# Patient Record
Sex: FEMALE | Race: WHITE | NOT HISPANIC OR LATINO
[De-identification: names, ages, dates, MRNs, and addresses within clinical notes are randomized per-mention and may not be internally consistent; named-entity substitution may affect disease eponyms.]

---

## 2017-01-03 ENCOUNTER — APPOINTMENT (OUTPATIENT)
Dept: OBGYN | Facility: CLINIC | Age: 47
End: 2017-01-03

## 2017-01-03 VITALS
WEIGHT: 145 LBS | HEART RATE: 99 BPM | BODY MASS INDEX: 24.75 KG/M2 | DIASTOLIC BLOOD PRESSURE: 75 MMHG | HEIGHT: 64 IN | SYSTOLIC BLOOD PRESSURE: 114 MMHG

## 2017-01-05 LAB — HPV HIGH+LOW RISK DNA PNL CVX: NEGATIVE

## 2017-01-07 LAB — CYTOLOGY CVX/VAG DOC THIN PREP: NORMAL

## 2019-05-22 ENCOUNTER — APPOINTMENT (OUTPATIENT)
Dept: OBGYN | Facility: CLINIC | Age: 49
End: 2019-05-22
Payer: COMMERCIAL

## 2019-05-22 VITALS
DIASTOLIC BLOOD PRESSURE: 70 MMHG | BODY MASS INDEX: 24.75 KG/M2 | HEIGHT: 64 IN | WEIGHT: 145 LBS | SYSTOLIC BLOOD PRESSURE: 120 MMHG

## 2019-05-22 PROCEDURE — 99396 PREV VISIT EST AGE 40-64: CPT

## 2019-05-31 LAB
CYTOLOGY CVX/VAG DOC THIN PREP: NORMAL
HPV HIGH+LOW RISK DNA PNL CVX: NOT DETECTED

## 2020-01-28 ENCOUNTER — EMERGENCY (EMERGENCY)
Facility: HOSPITAL | Age: 50
LOS: 1 days | Discharge: HOME | End: 2020-01-28
Attending: EMERGENCY MEDICINE
Payer: COMMERCIAL

## 2020-01-28 VITALS
HEIGHT: 66 IN | HEART RATE: 118 BPM | SYSTOLIC BLOOD PRESSURE: 148 MMHG | OXYGEN SATURATION: 100 % | WEIGHT: 145.06 LBS | TEMPERATURE: 98 F | RESPIRATION RATE: 20 BRPM | DIASTOLIC BLOOD PRESSURE: 86 MMHG

## 2020-01-28 LAB
ALBUMIN SERPL ELPH-MCNC: 4.6 G/DL — SIGNIFICANT CHANGE UP (ref 3.3–5.2)
ALP SERPL-CCNC: 58 U/L — SIGNIFICANT CHANGE UP (ref 40–120)
ALT FLD-CCNC: 14 U/L — SIGNIFICANT CHANGE UP
ANION GAP SERPL CALC-SCNC: 15 MMOL/L — SIGNIFICANT CHANGE UP (ref 5–17)
APTT BLD: 30.7 SEC — SIGNIFICANT CHANGE UP (ref 27.5–36.3)
AST SERPL-CCNC: 21 U/L — SIGNIFICANT CHANGE UP
BASOPHILS # BLD AUTO: 0.03 K/UL — SIGNIFICANT CHANGE UP (ref 0–0.2)
BASOPHILS NFR BLD AUTO: 0.2 % — SIGNIFICANT CHANGE UP (ref 0–2)
BILIRUB SERPL-MCNC: 0.6 MG/DL — SIGNIFICANT CHANGE UP (ref 0.4–2)
BUN SERPL-MCNC: 15 MG/DL — SIGNIFICANT CHANGE UP (ref 8–20)
CALCIUM SERPL-MCNC: 9.4 MG/DL — SIGNIFICANT CHANGE UP (ref 8.6–10.2)
CHLORIDE SERPL-SCNC: 104 MMOL/L — SIGNIFICANT CHANGE UP (ref 98–107)
CO2 SERPL-SCNC: 21 MMOL/L — LOW (ref 22–29)
CREAT SERPL-MCNC: 0.56 MG/DL — SIGNIFICANT CHANGE UP (ref 0.5–1.3)
EOSINOPHIL # BLD AUTO: 0.05 K/UL — SIGNIFICANT CHANGE UP (ref 0–0.5)
EOSINOPHIL NFR BLD AUTO: 0.4 % — SIGNIFICANT CHANGE UP (ref 0–6)
GLUCOSE SERPL-MCNC: 107 MG/DL — HIGH (ref 70–99)
HCT VFR BLD CALC: 41.5 % — SIGNIFICANT CHANGE UP (ref 34.5–45)
HGB BLD-MCNC: 13.3 G/DL — SIGNIFICANT CHANGE UP (ref 11.5–15.5)
IMM GRANULOCYTES NFR BLD AUTO: 0.2 % — SIGNIFICANT CHANGE UP (ref 0–1.5)
INR BLD: 1.03 RATIO — SIGNIFICANT CHANGE UP (ref 0.88–1.16)
LYMPHOCYTES # BLD AUTO: 1.41 K/UL — SIGNIFICANT CHANGE UP (ref 1–3.3)
LYMPHOCYTES # BLD AUTO: 11.2 % — LOW (ref 13–44)
MCHC RBC-ENTMCNC: 28.5 PG — SIGNIFICANT CHANGE UP (ref 27–34)
MCHC RBC-ENTMCNC: 32 GM/DL — SIGNIFICANT CHANGE UP (ref 32–36)
MCV RBC AUTO: 89.1 FL — SIGNIFICANT CHANGE UP (ref 80–100)
MONOCYTES # BLD AUTO: 0.66 K/UL — SIGNIFICANT CHANGE UP (ref 0–0.9)
MONOCYTES NFR BLD AUTO: 5.2 % — SIGNIFICANT CHANGE UP (ref 2–14)
NEUTROPHILS # BLD AUTO: 10.43 K/UL — HIGH (ref 1.8–7.4)
NEUTROPHILS NFR BLD AUTO: 82.8 % — HIGH (ref 43–77)
PLATELET # BLD AUTO: 268 K/UL — SIGNIFICANT CHANGE UP (ref 150–400)
POTASSIUM SERPL-MCNC: 4.1 MMOL/L — SIGNIFICANT CHANGE UP (ref 3.5–5.3)
POTASSIUM SERPL-SCNC: 4.1 MMOL/L — SIGNIFICANT CHANGE UP (ref 3.5–5.3)
PROT SERPL-MCNC: 7.2 G/DL — SIGNIFICANT CHANGE UP (ref 6.6–8.7)
PROTHROM AB SERPL-ACNC: 11.8 SEC — SIGNIFICANT CHANGE UP (ref 10–12.9)
RBC # BLD: 4.66 M/UL — SIGNIFICANT CHANGE UP (ref 3.8–5.2)
RBC # FLD: 11.8 % — SIGNIFICANT CHANGE UP (ref 10.3–14.5)
SODIUM SERPL-SCNC: 140 MMOL/L — SIGNIFICANT CHANGE UP (ref 135–145)
WBC # BLD: 12.61 K/UL — HIGH (ref 3.8–10.5)
WBC # FLD AUTO: 12.61 K/UL — HIGH (ref 3.8–10.5)

## 2020-01-28 PROCEDURE — 84702 CHORIONIC GONADOTROPIN TEST: CPT

## 2020-01-28 PROCEDURE — 36415 COLL VENOUS BLD VENIPUNCTURE: CPT

## 2020-01-28 PROCEDURE — 85027 COMPLETE CBC AUTOMATED: CPT

## 2020-01-28 PROCEDURE — 99285 EMERGENCY DEPT VISIT HI MDM: CPT

## 2020-01-28 PROCEDURE — 71260 CT THORAX DX C+: CPT

## 2020-01-28 PROCEDURE — 99284 EMERGENCY DEPT VISIT MOD MDM: CPT | Mod: 25

## 2020-01-28 PROCEDURE — 71046 X-RAY EXAM CHEST 2 VIEWS: CPT

## 2020-01-28 PROCEDURE — 80053 COMPREHEN METABOLIC PANEL: CPT

## 2020-01-28 PROCEDURE — 71260 CT THORAX DX C+: CPT | Mod: 26

## 2020-01-28 PROCEDURE — 94640 AIRWAY INHALATION TREATMENT: CPT

## 2020-01-28 PROCEDURE — 85610 PROTHROMBIN TIME: CPT

## 2020-01-28 PROCEDURE — 85730 THROMBOPLASTIN TIME PARTIAL: CPT

## 2020-01-28 PROCEDURE — 71046 X-RAY EXAM CHEST 2 VIEWS: CPT | Mod: 26

## 2020-01-28 RX ORDER — AZITHROMYCIN 500 MG/1
1 TABLET, FILM COATED ORAL
Qty: 4 | Refills: 0
Start: 2020-01-28 | End: 2020-01-31

## 2020-01-28 RX ORDER — AZITHROMYCIN 500 MG/1
500 TABLET, FILM COATED ORAL ONCE
Refills: 0 | Status: COMPLETED | OUTPATIENT
Start: 2020-01-28 | End: 2020-01-28

## 2020-01-28 RX ORDER — IPRATROPIUM/ALBUTEROL SULFATE 18-103MCG
3 AEROSOL WITH ADAPTER (GRAM) INHALATION ONCE
Refills: 0 | Status: COMPLETED | OUTPATIENT
Start: 2020-01-28 | End: 2020-01-28

## 2020-01-28 RX ADMIN — Medication 3 MILLILITER(S): at 10:27

## 2020-01-28 RX ADMIN — AZITHROMYCIN 500 MILLIGRAM(S): 500 TABLET, FILM COATED ORAL at 15:04

## 2020-01-28 RX ADMIN — Medication 100 MILLIGRAM(S): at 10:22

## 2020-01-28 NOTE — ED ADULT NURSE NOTE - CHIEF COMPLAINT QUOTE
Patient arrived to ED today with c/o episode of coughing up blood this morning x1.  Patient reports that she has not had this happen before.

## 2020-01-28 NOTE — ED STATDOCS - PROVIDER TOKENS
FREE:[LAST:[[facility]],PHONE:[(   )    -],FAX:[(   )    -],ADDRESS:[Address: [address] [place] [address]    Phone: [phone]],FOLLOWUP:[1-3 Days]]

## 2020-01-28 NOTE — ED STATDOCS - CLINICAL SUMMARY MEDICAL DECISION MAKING FREE TEXT BOX
Pt presenting with sudden onset of cough spasm, coughed up blood; no fever or recent travel, no blood thinner use, no wheezing; will check CXR, give Albuterol treatment, Tessalon perle, and reassess.

## 2020-01-28 NOTE — ED ADULT NURSE NOTE - NSIMPLEMENTINTERV_GEN_ALL_ED
Implemented All Universal Safety Interventions:  [place] to call system. Call bell, personal items and telephone within reach. Instruct patient to call for assistance. Room bathroom lighting operational. Non-slip footwear when patient is off stretcher. Physically safe environment: no spills, clutter or unnecessary equipment. Stretcher in lowest position, wheels locked, appropriate side rails in place.

## 2020-01-28 NOTE — ED STATDOCS - OBJECTIVE STATEMENT
49 year old F pt with past medical history significant for thyroid disease presents to the ED c/o sudden onset of coughing spasm this morning, with bloody sputum. Pt states that she was making her son a sandwich this morning, drank some coffee, ate a piece of salami, but after a little bit of time, she suddenly had a 5-10 minute episode of sudden coughing, and after spitting up the phlegm produced by the cough, she saw that her sputum was bloody in consistency. She has continued to have an intermittent cough, with decreased production and intensity. Yesterday, pt states that she was flatulent than normal, with mild abdominal discomfort which has resolved. No recent cough, cold, or congestion. No significant surgical history. No blood thinner use. No recent travel. No sick contact. Former smoker. Denies fever, chills, diaphoresis, nausea, vomiting, diarrhea. No further complaints at this time.

## 2020-01-28 NOTE — ED STATDOCS - ATTENDING CONTRIBUTION TO CARE
I, Marcos Carnes, performed the initial face to face bedside interview with this patient regarding history of present illness, review of symptoms and relevant past medical, social and family history.  I completed an independent physical examination.  I was the initial provider who evaluated this patient. I have signed out the follow up of any pending tests (i.e. labs, radiological studies) to the ACP.  I have communicated the patient’s plan of care and disposition with the ACP.

## 2020-01-28 NOTE — ED STATDOCS - CARE PROVIDER_API CALL
[facility],   Address: [address] [place] [address] [address] [address]    Phone: [phone]  Phone: (   )    -  Fax: (   )    -  Follow Up Time: 1-3 Days

## 2020-01-28 NOTE — ED STATDOCS - PATIENT PORTAL LINK FT
You can access the FollowMyHealth Patient Portal offered by [facility] by registering at the following website: [email or web address]. By joining [employer]’s FollowMyHealth portal, you will also be able to view your health information using other applications (apps) compatible with our system.

## 2020-01-28 NOTE — ED STATDOCS - PHYSICAL EXAMINATION
VITAL SIGNS: I have reviewed nursing notes and confirm.  CONSTITUTIONAL: Well-developed; well-nourished; in no acute distress.  SKIN: Skin exam is warm and dry, no acute rash.  HEAD: Normocephalic; atraumatic.  EYES: PERRL, EOM intact; conjunctiva and sclera clear.  ENT: No nasal discharge; airway clear. Throat clear. (+) no blood visualized in nares or oropharynx  NECK: Supple; non tender.    CARD: S1, S2 normal; no murmurs, gallops, or rubs. Regular rate and rhythm.  RESP: No wheezes,  no rales or rhonchi. (+) occasional dry cough.   ABD:  soft; non-distended; non-tender;   EXT: Normal ROM. No clubbing, cyanosis or edema.  NEURO: Alert, oriented. Grossly unremarkable. No focal deficits.   PSYCH: Cooperative, appropriate.

## 2020-01-28 NOTE — ED STATDOCS - PROGRESS NOTE DETAILS
Received pt in results waiting, HPI ROS and PE of intake provider reviewed. Patient emotional, anxious, CXR negative, patient made aware of results. Not coughing blood at this time. No blood in oral pharyxn, lungs CTA bilaterally. Nasal passages dry, no evidence of dry blood in nose. Emotional support provided. Denies pain. Pending CT. Multiple small lung nodules noted on CT scan. Also inflammatory vs infectious changes. In the presence of neutrophilia and productive cough will treat with zpack and pt to follow up with pulmonology. Patient aware of Ct results

## 2020-01-28 NOTE — ED ADULT NURSE NOTE - OBJECTIVE STATEMENT
pt comes to ED with reports of coughing up bloody sputum this morning. pt with no use of thinners, even and unlabored resps, dry cough noted. no chest pain no vomiting no urinary complaints. no back pain no SOB.

## 2020-01-28 NOTE — ED STATDOCS - NS ED ROS FT
Review of Systems  •	CONSTITUTIONAL - no  fever, no diaphoresis, no weight change  •	SKIN - no rash  •	HEMATOLOGIC - no bleeding, no bruising  •	EYES - no eye pain, no blurred vision  •	ENT - no change in hearing, no pain  •	RESPIRATORY - no shortness of breath, (+) cough, (+) bloody sputum  •	CARDIAC - no chest pain, no palpitations  •	GI - no abd pain, no nausea, no vomiting, no diarrhea, no constipation, no bleeding  •	GENITO-URINARY - no discharge, no dysuria; no hematuria,   •	ENDO - no polydypsia, no polyurea, no heat/no cold intolerance  •	MUSCULOSKELETAL - no joint pain, no swelling, no redness  •	NEUROLOGIC - no weakness, no headache, no anesthesia, no paresthesias  •	PSYCH - no anxiety, non suicidal, non homicidal, no hallucination, no depression

## 2020-01-30 ENCOUNTER — EMERGENCY (EMERGENCY)
Facility: HOSPITAL | Age: 50
LOS: 1 days | Discharge: HOME | End: 2020-01-30
Attending: EMERGENCY MEDICINE
Payer: COMMERCIAL

## 2020-01-30 VITALS
HEIGHT: 66 IN | WEIGHT: 145.06 LBS | RESPIRATION RATE: 16 BRPM | TEMPERATURE: 98 F | DIASTOLIC BLOOD PRESSURE: 96 MMHG | OXYGEN SATURATION: 99 % | SYSTOLIC BLOOD PRESSURE: 133 MMHG | HEART RATE: 73 BPM

## 2020-01-30 LAB
ANION GAP SERPL CALC-SCNC: 14 MMOL/L — SIGNIFICANT CHANGE UP (ref 5–17)
BASOPHILS # BLD AUTO: 0.03 K/UL — SIGNIFICANT CHANGE UP (ref 0–0.2)
BASOPHILS NFR BLD AUTO: 0.3 % — SIGNIFICANT CHANGE UP (ref 0–2)
BUN SERPL-MCNC: 14 MG/DL — SIGNIFICANT CHANGE UP (ref 8–20)
CALCIUM SERPL-MCNC: 9.2 MG/DL — SIGNIFICANT CHANGE UP (ref 8.6–10.2)
CHLORIDE SERPL-SCNC: 106 MMOL/L — SIGNIFICANT CHANGE UP (ref 98–107)
CO2 SERPL-SCNC: 22 MMOL/L — SIGNIFICANT CHANGE UP (ref 22–29)
CREAT SERPL-MCNC: 0.7 MG/DL — SIGNIFICANT CHANGE UP (ref 0.5–1.3)
D DIMER BLD IA.RAPID-MCNC: <150 NG/ML DDU — SIGNIFICANT CHANGE UP
EOSINOPHIL # BLD AUTO: 0.07 K/UL — SIGNIFICANT CHANGE UP (ref 0–0.5)
EOSINOPHIL NFR BLD AUTO: 0.7 % — SIGNIFICANT CHANGE UP (ref 0–6)
GLUCOSE SERPL-MCNC: 109 MG/DL — HIGH (ref 70–99)
HCT VFR BLD CALC: 38.4 % — SIGNIFICANT CHANGE UP (ref 34.5–45)
HGB BLD-MCNC: 12.4 G/DL — SIGNIFICANT CHANGE UP (ref 11.5–15.5)
IMM GRANULOCYTES NFR BLD AUTO: 0.3 % — SIGNIFICANT CHANGE UP (ref 0–1.5)
LYMPHOCYTES # BLD AUTO: 1.7 K/UL — SIGNIFICANT CHANGE UP (ref 1–3.3)
LYMPHOCYTES # BLD AUTO: 16.4 % — SIGNIFICANT CHANGE UP (ref 13–44)
MCHC RBC-ENTMCNC: 28.2 PG — SIGNIFICANT CHANGE UP (ref 27–34)
MCHC RBC-ENTMCNC: 32.3 GM/DL — SIGNIFICANT CHANGE UP (ref 32–36)
MCV RBC AUTO: 87.3 FL — SIGNIFICANT CHANGE UP (ref 80–100)
MONOCYTES # BLD AUTO: 0.57 K/UL — SIGNIFICANT CHANGE UP (ref 0–0.9)
MONOCYTES NFR BLD AUTO: 5.5 % — SIGNIFICANT CHANGE UP (ref 2–14)
NEUTROPHILS # BLD AUTO: 7.95 K/UL — HIGH (ref 1.8–7.4)
NEUTROPHILS NFR BLD AUTO: 76.8 % — SIGNIFICANT CHANGE UP (ref 43–77)
PLATELET # BLD AUTO: 285 K/UL — SIGNIFICANT CHANGE UP (ref 150–400)
POTASSIUM SERPL-MCNC: 4 MMOL/L — SIGNIFICANT CHANGE UP (ref 3.5–5.3)
POTASSIUM SERPL-SCNC: 4 MMOL/L — SIGNIFICANT CHANGE UP (ref 3.5–5.3)
RBC # BLD: 4.4 M/UL — SIGNIFICANT CHANGE UP (ref 3.8–5.2)
RBC # FLD: 11.7 % — SIGNIFICANT CHANGE UP (ref 10.3–14.5)
SODIUM SERPL-SCNC: 142 MMOL/L — SIGNIFICANT CHANGE UP (ref 135–145)
WBC # BLD: 10.35 K/UL — SIGNIFICANT CHANGE UP (ref 3.8–10.5)
WBC # FLD AUTO: 10.35 K/UL — SIGNIFICANT CHANGE UP (ref 3.8–10.5)

## 2020-01-30 PROCEDURE — 85027 COMPLETE CBC AUTOMATED: CPT

## 2020-01-30 PROCEDURE — 99283 EMERGENCY DEPT VISIT LOW MDM: CPT | Mod: 25

## 2020-01-30 PROCEDURE — 85379 FIBRIN DEGRADATION QUANT: CPT

## 2020-01-30 PROCEDURE — 80048 BASIC METABOLIC PNL TOTAL CA: CPT

## 2020-01-30 PROCEDURE — 71046 X-RAY EXAM CHEST 2 VIEWS: CPT

## 2020-01-30 PROCEDURE — 36415 COLL VENOUS BLD VENIPUNCTURE: CPT

## 2020-01-30 PROCEDURE — 71046 X-RAY EXAM CHEST 2 VIEWS: CPT | Mod: 26

## 2020-01-30 PROCEDURE — 99284 EMERGENCY DEPT VISIT MOD MDM: CPT

## 2020-01-30 NOTE — ED PROVIDER NOTE - PATIENT PORTAL LINK FT
You can access the FollowMyHealth Patient Portal offered by [facility] by registering at the following website: [email or web address]. By joining [employer]’s FollowMyHealth portal, you will also be able to view your health information using other applications (apps) compatible with our system.

## 2020-01-30 NOTE — ED PROVIDER NOTE - OBJECTIVE STATEMENT
Pt is a 48yo F with no PMH presenting with one episode of coughing up bloody sputum. Patient states that she was here 2 days ago when she coughed up blood and was diagnosed with bronchitis/pneumonia. She was prescribed azithromycin at that time for a five day course. She states that she has been taking it and has felt otherwise quite well. Patient states that today she felt a lump in her throat and forced out a cough that revealed blood intermixed with sputum. Patient became highly concerned and went to urgent care, who instructed patient to come to the ED. Patient currently very tearful and upset about the situation and fears unknown complications or issues related to coughing up bloody sputum.  Denies coughing outside of the episode today, SOB, chest pain, fever, chills, LE edema/swelling, LE pain, no hormone use, recent travel.

## 2020-01-30 NOTE — ED PROVIDER NOTE - CARE PROVIDERS DIRECT ADDRESSES
[email or web address]

## 2020-01-30 NOTE — ED ADULT TRIAGE NOTE - CHIEF COMPLAINT QUOTE
one episode of hemoptysis today. pt reports she feels fine otherwise. reports same thing happened few days ago. reports "I was seen here and they told me I had pneumonia  or bronchitis.' A and o x3. breathing even and unlabored.

## 2020-01-30 NOTE — ED PROVIDER NOTE - CLINICAL SUMMARY MEDICAL DECISION MAKING FREE TEXT BOX
Patient returning to [facility] after episode of bloody sputum. Highly concerned and tearful about possible diagnoses. Patient counseled about bronchitis and pnemonia treatment, management, outcomes, and expectations. Will repeat basic labwork, cxr, and perform d-dimer for evaluation.

## 2020-01-30 NOTE — ED PROVIDER NOTE - CARE PROVIDER_API CALL
Cristo Calvillo (DO)  Critical Care Medicine; Internal Medicine; Pulmonary Disease  [address] [place] [address]  Phone: [phone]  Fax: [phone]  Follow Up Time: 4-6 Days

## 2020-01-30 NOTE — ED PROVIDER NOTE - NS ED ROS FT
General: Denies fever, chills  HEENT: Denies sensory changes, sore throat  Neck: Denies neck pain, neck stiffness  Resp: Endorses bloody sputum production  Cardiovascular: Denies CP, palpitations, LE edema  GI: Denies nausea, vomiting, abdominal pain  : Denies dysuria, hematuria, frequency, incontinence  MSK: Denies back pain  Neuro: Denies HA, dizziness, numbness, weakness  Skin: Denies rashes

## 2020-01-30 NOTE — ED PROVIDER NOTE - ATTENDING CONTRIBUTION TO CARE
[place]: I performed a face to face bedside interview with patient regarding history of present illness, review of symptoms and past medical history. I completed an independent physical exam.  I have discussed patient's plan of care with resident.   I agree with note as stated above including HISTORY OF PRESENT ILLNESS, HIV, PAST MEDICAL/SURGICAL/FAMILY/SOCIAL HISTORY, ALLERGIES AND HOME MEDICATIONS, REVIEW OF SYSTEMS, PHYSICAL EXAM, MEDICAL DECISION MAKING and any PROGRESS NOTES during the time I functioned as the attending physician for this patient unless otherwise noted. My brief assessment is as follows: 49F no PMH seen in ED 2 days ago for cough and episodes of hemoptysis diagnosed with PNA on CT chest and sent home on z-pack. Presents s/p 2 episodes of hemoptysis. Pt with no hemoptysis in the day between ER visits. Is clinically feeling better, reports feeling nervous and anxious about the hemoptysis.   Gen: Well appearing in NAD  Head: NC/AT  Neck: trachea midline  Resp:  No distress, CTAB  CV: RRR  Ext: no deformities, no calf ttp  Neuro:  A&O appears non focal  Skin:  Warm and dry as visualized  Psych:  Anxious and tearful  Patient well appearing, normal vitals. Repeat blood work including D-dimer unremarkable. CXR with NAF. Pt with one episode of ~1/2 tablespoon of blood tinged sputum in ED. Reassured pt regarding likely PNA vs bronchitis. Encouraged to follow with PMD and pulm. Strict return precautions given.

## 2020-01-30 NOTE — ED PROVIDER NOTE - PHYSICAL EXAMINATION
General: Well appearing female tearful during interview  HEENT: Normocephalic, atraumatic. Moist mucous membranes. Oropharynx clear. No lymphadenopathy.  Eyes: No scleral icterus. EOMI. ALONSO.  Neck:. Soft and supple. Full ROM without pain. No midline tenderness  Cardiac: Regular rate and regular rhythm. No murmurs, rubs, gallops. Peripheral pulses 2+ and symmetric. No LE edema.  Resp: Lungs CTAB. Speaking in full sentences. No wheezes, rales or rhonchi.  Abd: Soft, non-tender, non-distended. No guarding or rebound.   Back: Spine midline and non-tender. No CVA tenderness.    Skin: No rashes, abrasions, or lacerations.  Neuro: AO x 3. Moves all extremities symmetrically. Motor strength and sensation grossly intact

## 2020-01-31 ENCOUNTER — EMERGENCY (EMERGENCY)
Facility: HOSPITAL | Age: 50
LOS: 1 days | Discharge: HOME | End: 2020-01-31
Attending: STUDENT IN AN ORGANIZED HEALTH CARE EDUCATION/TRAINING PROGRAM
Payer: COMMERCIAL

## 2020-01-31 VITALS
WEIGHT: 145.06 LBS | HEART RATE: 98 BPM | SYSTOLIC BLOOD PRESSURE: 112 MMHG | RESPIRATION RATE: 20 BRPM | TEMPERATURE: 99 F | DIASTOLIC BLOOD PRESSURE: 69 MMHG | OXYGEN SATURATION: 98 % | HEIGHT: 66 IN

## 2020-01-31 PROBLEM — Z78.9 OTHER SPECIFIED HEALTH STATUS: Chronic | Status: ACTIVE | Noted: 2020-01-28

## 2020-01-31 LAB
HCT VFR BLD CALC: 37.7 % — SIGNIFICANT CHANGE UP (ref 34.5–45)
HGB BLD-MCNC: 12.2 G/DL — SIGNIFICANT CHANGE UP (ref 11.5–15.5)

## 2020-01-31 PROCEDURE — 99283 EMERGENCY DEPT VISIT LOW MDM: CPT

## 2020-01-31 PROCEDURE — 85018 HEMOGLOBIN: CPT

## 2020-01-31 PROCEDURE — 85014 HEMATOCRIT: CPT

## 2020-01-31 PROCEDURE — 99284 EMERGENCY DEPT VISIT MOD MDM: CPT

## 2020-01-31 PROCEDURE — 36415 COLL VENOUS BLD VENIPUNCTURE: CPT

## 2020-01-31 NOTE — ED PROVIDER NOTE - ATTENDING CONTRIBUTION TO CARE
50yo female with recently diagnosed pna presents with hemoptysis. Pt was seen here earlier for same and was told due to pna but came in because had a couple more episodes. Pt denies fevers/chills, ha, loc, focal neuro deficits, cp/sob/palp, weakness, abd pain/n/v/d, urinary symptoms. Pt well appearing exam wnl, h/h stable no further episodes in ED, follow up with pulm, given strict return instructions

## 2020-01-31 NOTE — ED ADULT TRIAGE NOTE - CHIEF COMPLAINT QUOTE
Patient is alert and oriented x4 c/o multiple episodes of coughing up blood today. seen here two days ago and seen here earlier today as well. diagnosed with pneumonia 2 days ago. breathing unlabored. denies chest pain or sob. no acute distress noted. talking in complete sentences with out diffiuclty.

## 2020-01-31 NOTE — ED PROVIDER NOTE - CLINICAL SUMMARY MEDICAL DECISION MAKING FREE TEXT BOX
49f with recurrent episodes of hemoptysis with recent dx pf PNA. VSS. No evidence of resp distress. Lungs CTA b/l. Pt has follow up appointment with pulmonologist in 2 weeks. Will repeat H/H and reassess

## 2020-01-31 NOTE — ED PROVIDER NOTE - OBJECTIVE STATEMENT
Pt is a 48 y/o f, no PMH, presents to ED c/o 2 episodes of hemoptysis PTA. Pt recently diagnosed with PNA 1/28/20, treated with a 5 day z-pack ( 2 doses remaining ), and has been having multiple episodes of hemoptysis. Pt was evaluated for similar complaints yesterday evening and had repeat blood work and CXR w/o acute findings. Pt states her symptoms have not worsened although she is concerned over her persistent hemoptysis. No other complaints at this time. Denies fevers, chills, SOB, HA, dizziness, weakness, lethargy, back pain, nausea, vomiting.

## 2020-01-31 NOTE — ED PROVIDER NOTE - PATIENT PORTAL LINK FT
You can access the FollowMyHealth Patient Portal offered by [facility] by registering at the following website: [email or web address]. By joining [employer]’s FollowMyHealth portal, you will also be able to view your health information using other applications (apps) compatible with our system.

## 2020-01-31 NOTE — ED PROVIDER NOTE - PROGRESS NOTE DETAILS
PA NOTE: H/H stable, no coughing in ED. Advised to continue taking previously prescribed antitussive and azithromycin. Educated to return to ED if symptoms worsen.

## 2020-02-10 NOTE — COUNSELING
Per result letter from Dr. Keith Hutchinson     Unfortunately, your vitamin D level remains below desirable levels. I recommend using a prescription medicine to help improve this, Ergocalciferol 50,000 IU orally weekly for 12 weeks. Following the 12 weeks, I recommend taking 5000 IU of vitamin D 3 over the counter daily.  In 3 months, we should check the following lab:  Â   Vit D [Breast Self Exam] : breast self exam

## 2020-07-28 DIAGNOSIS — Z12.39 ENCOUNTER FOR OTHER SCREENING FOR MALIGNANT NEOPLASM OF BREAST: ICD-10-CM

## 2020-07-29 ENCOUNTER — APPOINTMENT (OUTPATIENT)
Dept: OBGYN | Facility: CLINIC | Age: 50
End: 2020-07-29
Payer: COMMERCIAL

## 2020-07-29 VITALS
BODY MASS INDEX: 25.61 KG/M2 | SYSTOLIC BLOOD PRESSURE: 113 MMHG | DIASTOLIC BLOOD PRESSURE: 73 MMHG | HEART RATE: 86 BPM | HEIGHT: 64 IN | WEIGHT: 150 LBS

## 2020-07-29 PROCEDURE — 82270 OCCULT BLOOD FECES: CPT

## 2020-07-29 PROCEDURE — 99396 PREV VISIT EST AGE 40-64: CPT

## 2020-07-29 NOTE — PHYSICAL EXAM
[Alert] : alert [Awake] : awake [Acute Distress] : no acute distress [Mass] : no breast mass [Nipple Discharge] : no nipple discharge [Soft] : soft [Axillary LAD] : no axillary lymphadenopathy [Oriented x3] : oriented to person, place, and time [Tender] : non tender [Normal] : uterus [No Bleeding] : there was no active vaginal bleeding [No Tenderness] : no rectal tenderness [Uterine Adnexae] : were not tender and not enlarged [Occult Blood] : occult blood test from digital rectal exam was negative [RRR, No Murmurs] : RRR, no murmurs [CTAB] : CTAB

## 2020-07-31 LAB — HPV HIGH+LOW RISK DNA PNL CVX: NOT DETECTED

## 2020-08-07 LAB — CYTOLOGY CVX/VAG DOC THIN PREP: NORMAL

## 2021-08-02 ENCOUNTER — NON-APPOINTMENT (OUTPATIENT)
Age: 51
End: 2021-08-02

## 2021-08-02 ENCOUNTER — APPOINTMENT (OUTPATIENT)
Dept: PULMONOLOGY | Facility: CLINIC | Age: 51
End: 2021-08-02
Payer: COMMERCIAL

## 2021-08-02 VITALS
HEART RATE: 94 BPM | SYSTOLIC BLOOD PRESSURE: 128 MMHG | DIASTOLIC BLOOD PRESSURE: 78 MMHG | TEMPERATURE: 97.4 F | OXYGEN SATURATION: 99 %

## 2021-08-02 PROCEDURE — 99203 OFFICE O/P NEW LOW 30 MIN: CPT

## 2021-08-02 RX ORDER — METRONIDAZOLE 7.5 MG/G
0.75 CREAM TOPICAL
Qty: 45 | Refills: 0 | Status: ACTIVE | COMMUNITY
Start: 2021-03-08

## 2021-08-02 RX ORDER — CYCLOSPORINE 0.5 MG/ML
0.05 EMULSION OPHTHALMIC
Qty: 180 | Refills: 0 | Status: ACTIVE | COMMUNITY
Start: 2021-04-01

## 2021-08-02 RX ORDER — LEVOTHYROXINE SODIUM 0.1 MG/1
100 TABLET ORAL
Qty: 90 | Refills: 0 | Status: ACTIVE | COMMUNITY
Start: 2021-06-04

## 2021-08-02 RX ORDER — SODIUM SULFACETAMIDE, SULFUR 90; 4 MG/G; MG/G
9-4 LIQUID TOPICAL
Qty: 473 | Refills: 0 | Status: ACTIVE | COMMUNITY
Start: 2020-10-23

## 2021-08-02 NOTE — HISTORY OF PRESENT ILLNESS
[Former] : former [Never] : never [TextBox_4] : the patient is 51 year F with a hx of hemoptysis and now had an episode of gurgling in her throat and then clear mucus but she had a few small drops of blood on her tissue placed into her throat   The patient had this episode 3 days ago and no further sx    [TextBox_11] : 0.25 [TextBox_13] : 20 [YearQuit] : 2016

## 2021-08-02 NOTE — REVIEW OF SYSTEMS
[Nasal Congestion] : nasal congestion [Postnasal Drip] : postnasal drip [Sinus Problems] : sinus problems [Cough] : no cough [Sputum] : no sputum [Dyspnea] : no dyspnea [SOB on Exertion] : no sob on exertion [Seasonal Allergies] : seasonal allergies [Negative] : Endocrine

## 2021-08-02 NOTE — PHYSICAL EXAM
[Normal Rate/Rhythm] : normal rate/rhythm [Normal S1, S2] : normal s1, s2 [No Murmurs] : no murmurs [No Resp Distress] : no resp distress [Clear to Auscultation Bilaterally] : clear to auscultation bilaterally [Normal Gait] : normal gait [No Clubbing] : no clubbing [No Cyanosis] : no cyanosis [Normal Color/ Pigmentation] : normal color/ pigmentation [Oriented x3] : oriented x3

## 2021-08-02 NOTE — REASON FOR VISIT
[Initial] : an initial visit [Pneumonia] : pneumonia [TextBox_44] : on friday pt had a cough and noticed a spot of blood

## 2021-08-02 NOTE — ASSESSMENT
[FreeTextEntry1] : /The patient does not describe another episode of hemoptysis  She had true hemoptysis with a PNA a few years ago  The patient  confident after we talked She was told how beneficial a COVID vaccine in the family.

## 2021-09-21 ENCOUNTER — APPOINTMENT (OUTPATIENT)
Dept: OBGYN | Facility: CLINIC | Age: 51
End: 2021-09-21
Payer: COMMERCIAL

## 2021-09-21 VITALS
WEIGHT: 150 LBS | HEIGHT: 64 IN | DIASTOLIC BLOOD PRESSURE: 76 MMHG | BODY MASS INDEX: 25.61 KG/M2 | SYSTOLIC BLOOD PRESSURE: 110 MMHG

## 2021-09-21 DIAGNOSIS — R92.2 INCONCLUSIVE MAMMOGRAM: ICD-10-CM

## 2021-09-21 PROCEDURE — 99396 PREV VISIT EST AGE 40-64: CPT

## 2021-09-21 NOTE — PHYSICAL EXAM
[Appropriately responsive] : appropriately responsive [Alert] : alert [No Acute Distress] : no acute distress [No Lymphadenopathy] : no lymphadenopathy [No Murmurs] : no murmurs [Regular Rate Rhythm] : regular rate rhythm [Clear to Auscultation B/L] : clear to auscultation bilaterally [Soft] : soft [Non-tender] : non-tender [Non-distended] : non-distended [No HSM] : No HSM [No Lesions] : no lesions [No Mass] : no mass [Oriented x3] : oriented x3 [Examination Of The Breasts] : a normal appearance [No Masses] : no breast masses were palpable [Labia Majora] : normal [Labia Minora] : normal [Normal] : normal [Uterine Adnexae] : normal

## 2021-09-27 LAB
CYTOLOGY CVX/VAG DOC THIN PREP: NORMAL
HPV HIGH+LOW RISK DNA PNL CVX: NOT DETECTED

## 2021-10-18 RX ORDER — FLUCONAZOLE 150 MG/1
150 TABLET ORAL
Qty: 1 | Refills: 1 | Status: ACTIVE | COMMUNITY
Start: 2021-10-18 | End: 1900-01-01

## 2022-09-27 ENCOUNTER — APPOINTMENT (OUTPATIENT)
Dept: OBGYN | Facility: CLINIC | Age: 52
End: 2022-09-27

## 2022-09-27 ENCOUNTER — NON-APPOINTMENT (OUTPATIENT)
Age: 52
End: 2022-09-27

## 2022-09-27 VITALS
HEIGHT: 64 IN | SYSTOLIC BLOOD PRESSURE: 132 MMHG | DIASTOLIC BLOOD PRESSURE: 79 MMHG | BODY MASS INDEX: 25.44 KG/M2 | WEIGHT: 149 LBS

## 2022-09-27 DIAGNOSIS — N95.1 MENOPAUSAL AND FEMALE CLIMACTERIC STATES: ICD-10-CM

## 2022-09-27 DIAGNOSIS — M54.16 RADICULOPATHY, LUMBAR REGION: ICD-10-CM

## 2022-09-27 DIAGNOSIS — L29.2 PRURITUS VULVAE: ICD-10-CM

## 2022-09-27 DIAGNOSIS — B37.9 CANDIDIASIS, UNSPECIFIED: ICD-10-CM

## 2022-09-27 DIAGNOSIS — N64.4 MASTODYNIA: ICD-10-CM

## 2022-09-27 DIAGNOSIS — F32.81 PREMENSTRUAL DYSPHORIC DISORDER: ICD-10-CM

## 2022-09-27 DIAGNOSIS — M51.26 OTHER INTERVERTEBRAL DISC DISPLACEMENT, LUMBAR REGION: ICD-10-CM

## 2022-09-27 DIAGNOSIS — Z87.09 PERSONAL HISTORY OF OTHER DISEASES OF THE RESPIRATORY SYSTEM: ICD-10-CM

## 2022-09-27 DIAGNOSIS — M51.36 OTHER INTERVERTEBRAL DISC DEGENERATION, LUMBAR REGION: ICD-10-CM

## 2022-09-27 PROCEDURE — 99072 ADDL SUPL MATRL&STAF TM PHE: CPT

## 2022-09-27 PROCEDURE — 99396 PREV VISIT EST AGE 40-64: CPT

## 2022-10-21 LAB
CYTOLOGY CVX/VAG DOC THIN PREP: NORMAL
HPV HIGH+LOW RISK DNA PNL CVX: NOT DETECTED

## 2023-11-21 ENCOUNTER — APPOINTMENT (OUTPATIENT)
Dept: OBGYN | Facility: CLINIC | Age: 53
End: 2023-11-21
Payer: COMMERCIAL

## 2023-11-21 VITALS
SYSTOLIC BLOOD PRESSURE: 123 MMHG | WEIGHT: 152 LBS | DIASTOLIC BLOOD PRESSURE: 78 MMHG | BODY MASS INDEX: 25.95 KG/M2 | HEIGHT: 64 IN

## 2023-11-21 DIAGNOSIS — R32 UNSPECIFIED URINARY INCONTINENCE: ICD-10-CM

## 2023-11-21 DIAGNOSIS — N92.6 IRREGULAR MENSTRUATION, UNSPECIFIED: ICD-10-CM

## 2023-11-21 DIAGNOSIS — Z01.419 ENCOUNTER FOR GYNECOLOGICAL EXAMINATION (GENERAL) (ROUTINE) W/OUT ABNORMAL FINDINGS: ICD-10-CM

## 2023-11-21 PROCEDURE — 99396 PREV VISIT EST AGE 40-64: CPT

## 2023-11-26 LAB
CYTOLOGY CVX/VAG DOC THIN PREP: NORMAL
HPV HIGH+LOW RISK DNA PNL CVX: NOT DETECTED

## 2023-12-11 ENCOUNTER — APPOINTMENT (OUTPATIENT)
Dept: PULMONOLOGY | Facility: CLINIC | Age: 53
End: 2023-12-11
Payer: COMMERCIAL

## 2023-12-11 VITALS
WEIGHT: 149 LBS | DIASTOLIC BLOOD PRESSURE: 72 MMHG | HEIGHT: 64 IN | TEMPERATURE: 97.3 F | BODY MASS INDEX: 25.44 KG/M2 | SYSTOLIC BLOOD PRESSURE: 122 MMHG | OXYGEN SATURATION: 99 % | HEART RATE: 79 BPM

## 2023-12-11 DIAGNOSIS — R04.2 HEMOPTYSIS: ICD-10-CM

## 2023-12-11 PROCEDURE — 94729 DIFFUSING CAPACITY: CPT

## 2023-12-11 PROCEDURE — 94010 BREATHING CAPACITY TEST: CPT

## 2023-12-11 PROCEDURE — 99204 OFFICE O/P NEW MOD 45 MIN: CPT | Mod: 25

## 2023-12-11 PROCEDURE — 94727 GAS DIL/WSHOT DETER LNG VOL: CPT

## 2023-12-15 DIAGNOSIS — J98.4 OTHER DISORDERS OF LUNG: ICD-10-CM

## 2024-04-16 NOTE — ED PROVIDER NOTE - PROGRESS NOTE DETAILS
EKG    -Monitor on telemetry     DM (diabetes mellitus)   -Accucheck  -A1c  -Sliding scale insulin   -Lantus twice daily   -Hypoglycemia protocol as warranted      PAF (paroxysmal atrial fibrillation)   TSH   -Currently NSR    -resume home medication Amiodarone, Coreg    -monitor on telemetry     Stage IV chronic kidney disease   - Nephrology consultation and recommendations appreciated  -baseline creat 2.3              - Creatinine 3.1 today               - Monitor I's and O's closely              - Monitor labs closely              - Avoid hypotension              - Avoid nephrotoxic agents  Constipation    -scheduled bowel regimen      DVT prophylactic: Heparin drip      Signed:  DWAYNE Bush - CNP, 4/15/2024 9:02 PM    EMR Dragon/Transcription disclaimer:   Much of this encounter note is an electronic transcription/translation of spoken language to printed text. The electronic translation of spoken language may permit erroneous, or at times, nonsensical words or phrases to be inadvertently transcribed; although attempts have made to review the note for such errors, some may still exist.   
Patient counseled extensively regarding return precautions to the ER. Patient informed about results of negative CXR, d-dimer, improving WBC count, and clinical improvement from initial presentation 2 days ago. Patient advised to visit PCP for further follow up and monitoring improvement. Patient also counseled to seek pulmonology consultation should her symptoms persist or should she require further reassurance. Patient expresses understand and agrees with plan for discharge at this time. She reports understand and has no questions.

## 2024-06-27 ENCOUNTER — APPOINTMENT (OUTPATIENT)
Dept: PULMONOLOGY | Facility: CLINIC | Age: 54
End: 2024-06-27

## 2024-06-27 ENCOUNTER — NON-APPOINTMENT (OUTPATIENT)
Age: 54
End: 2024-06-27

## 2024-12-04 ENCOUNTER — APPOINTMENT (OUTPATIENT)
Age: 54
End: 2024-12-04
Payer: COMMERCIAL

## 2024-12-04 VITALS
BODY MASS INDEX: 25.61 KG/M2 | HEIGHT: 64 IN | WEIGHT: 150 LBS | SYSTOLIC BLOOD PRESSURE: 144 MMHG | DIASTOLIC BLOOD PRESSURE: 80 MMHG

## 2024-12-04 DIAGNOSIS — N93.9 ABNORMAL UTERINE AND VAGINAL BLEEDING, UNSPECIFIED: ICD-10-CM

## 2024-12-04 DIAGNOSIS — Z01.419 ENCOUNTER FOR GYNECOLOGICAL EXAMINATION (GENERAL) (ROUTINE) W/OUT ABNORMAL FINDINGS: ICD-10-CM

## 2024-12-04 PROCEDURE — 99396 PREV VISIT EST AGE 40-64: CPT

## 2024-12-04 PROCEDURE — 99214 OFFICE O/P EST MOD 30 MIN: CPT | Mod: 25

## 2024-12-04 PROCEDURE — 99459 PELVIC EXAMINATION: CPT

## 2024-12-05 LAB
FSH SERPL-MCNC: 21.6 IU/L
HCT VFR BLD CALC: 41.2 %
HGB BLD-MCNC: 13.9 G/DL
MCHC RBC-ENTMCNC: 29.6 PG
MCHC RBC-ENTMCNC: 33.7 G/DL
MCV RBC AUTO: 87.8 FL
PLATELET # BLD AUTO: 375 K/UL
RBC # BLD: 4.69 M/UL
RBC # FLD: 12.3 %
TSH SERPL-ACNC: 3.73 UIU/ML
WBC # FLD AUTO: 8.97 K/UL

## 2024-12-08 ENCOUNTER — EMERGENCY (EMERGENCY)
Facility: HOSPITAL | Age: 54
LOS: 1 days | Discharge: HOME | End: 2024-12-08
Attending: STUDENT IN AN ORGANIZED HEALTH CARE EDUCATION/TRAINING PROGRAM
Payer: COMMERCIAL

## 2024-12-08 VITALS
TEMPERATURE: 98 F | RESPIRATION RATE: 18 BRPM | HEART RATE: 90 BPM | WEIGHT: 151.46 LBS | DIASTOLIC BLOOD PRESSURE: 81 MMHG | OXYGEN SATURATION: 98 % | SYSTOLIC BLOOD PRESSURE: 144 MMHG

## 2024-12-08 VITALS
OXYGEN SATURATION: 96 % | RESPIRATION RATE: 18 BRPM | TEMPERATURE: 99 F | HEART RATE: 84 BPM | DIASTOLIC BLOOD PRESSURE: 81 MMHG | SYSTOLIC BLOOD PRESSURE: 128 MMHG

## 2024-12-08 DIAGNOSIS — Z98.891 HISTORY OF UTERINE SCAR FROM PREVIOUS SURGERY: Chronic | ICD-10-CM

## 2024-12-08 LAB
ALBUMIN SERPL ELPH-MCNC: 4.1 G/DL — SIGNIFICANT CHANGE UP (ref 3.3–5.2)
ALP SERPL-CCNC: 63 U/L — SIGNIFICANT CHANGE UP (ref 40–120)
ALT FLD-CCNC: 20 U/L — SIGNIFICANT CHANGE UP
ANION GAP SERPL CALC-SCNC: 12 MMOL/L — SIGNIFICANT CHANGE UP (ref 5–17)
APTT BLD: 31.2 SEC — SIGNIFICANT CHANGE UP (ref 24.5–35.6)
AST SERPL-CCNC: 25 U/L — SIGNIFICANT CHANGE UP
BASOPHILS # BLD AUTO: 0.03 K/UL — SIGNIFICANT CHANGE UP (ref 0–0.2)
BASOPHILS NFR BLD AUTO: 0.4 % — SIGNIFICANT CHANGE UP (ref 0–2)
BILIRUB SERPL-MCNC: 0.4 MG/DL — SIGNIFICANT CHANGE UP (ref 0.4–2)
BLD GP AB SCN SERPL QL: SIGNIFICANT CHANGE UP
BUN SERPL-MCNC: 11.8 MG/DL — SIGNIFICANT CHANGE UP (ref 8–20)
CALCIUM SERPL-MCNC: 8.5 MG/DL — SIGNIFICANT CHANGE UP (ref 8.4–10.5)
CHLORIDE SERPL-SCNC: 106 MMOL/L — SIGNIFICANT CHANGE UP (ref 96–108)
CO2 SERPL-SCNC: 24 MMOL/L — SIGNIFICANT CHANGE UP (ref 22–29)
CREAT SERPL-MCNC: 0.47 MG/DL — LOW (ref 0.5–1.3)
EGFR: 113 ML/MIN/1.73M2 — SIGNIFICANT CHANGE UP
EGFR: 113 ML/MIN/1.73M2 — SIGNIFICANT CHANGE UP
EOSINOPHIL # BLD AUTO: 0.1 K/UL — SIGNIFICANT CHANGE UP (ref 0–0.5)
EOSINOPHIL NFR BLD AUTO: 1.4 % — SIGNIFICANT CHANGE UP (ref 0–6)
GLUCOSE SERPL-MCNC: 97 MG/DL — SIGNIFICANT CHANGE UP (ref 70–99)
HCG SERPL-ACNC: <4 MIU/ML — SIGNIFICANT CHANGE UP
HCT VFR BLD CALC: 41.1 % — SIGNIFICANT CHANGE UP (ref 34.5–45)
HGB BLD-MCNC: 13.8 G/DL — SIGNIFICANT CHANGE UP (ref 11.5–15.5)
IMM GRANULOCYTES NFR BLD AUTO: 0.1 % — SIGNIFICANT CHANGE UP (ref 0–0.9)
INR BLD: 0.93 RATIO — SIGNIFICANT CHANGE UP (ref 0.85–1.16)
LACTATE BLDV-MCNC: 1 MMOL/L — SIGNIFICANT CHANGE UP (ref 0.5–2)
LYMPHOCYTES # BLD AUTO: 1.56 K/UL — SIGNIFICANT CHANGE UP (ref 1–3.3)
LYMPHOCYTES # BLD AUTO: 21.1 % — SIGNIFICANT CHANGE UP (ref 13–44)
MCHC RBC-ENTMCNC: 29.3 PG — SIGNIFICANT CHANGE UP (ref 27–34)
MCHC RBC-ENTMCNC: 33.6 G/DL — SIGNIFICANT CHANGE UP (ref 32–36)
MCV RBC AUTO: 87.3 FL — SIGNIFICANT CHANGE UP (ref 80–100)
MONOCYTES # BLD AUTO: 0.51 K/UL — SIGNIFICANT CHANGE UP (ref 0–0.9)
MONOCYTES NFR BLD AUTO: 6.9 % — SIGNIFICANT CHANGE UP (ref 2–14)
NEUTROPHILS # BLD AUTO: 5.17 K/UL — SIGNIFICANT CHANGE UP (ref 1.8–7.4)
NEUTROPHILS NFR BLD AUTO: 70.1 % — SIGNIFICANT CHANGE UP (ref 43–77)
OB PNL STL: NEGATIVE — SIGNIFICANT CHANGE UP
PLATELET # BLD AUTO: 352 K/UL — SIGNIFICANT CHANGE UP (ref 150–400)
POTASSIUM SERPL-MCNC: 4.1 MMOL/L — SIGNIFICANT CHANGE UP (ref 3.5–5.3)
POTASSIUM SERPL-SCNC: 4.1 MMOL/L — SIGNIFICANT CHANGE UP (ref 3.5–5.3)
PROT SERPL-MCNC: 7 G/DL — SIGNIFICANT CHANGE UP (ref 6.6–8.7)
PROTHROM AB SERPL-ACNC: 10.8 SEC — SIGNIFICANT CHANGE UP (ref 9.9–13.4)
RBC # BLD: 4.71 M/UL — SIGNIFICANT CHANGE UP (ref 3.8–5.2)
RBC # FLD: 11.9 % — SIGNIFICANT CHANGE UP (ref 10.3–14.5)
SODIUM SERPL-SCNC: 142 MMOL/L — SIGNIFICANT CHANGE UP (ref 135–145)
WBC # BLD: 7.38 K/UL — SIGNIFICANT CHANGE UP (ref 3.8–10.5)
WBC # FLD AUTO: 7.38 K/UL — SIGNIFICANT CHANGE UP (ref 3.8–10.5)

## 2024-12-08 PROCEDURE — 99285 EMERGENCY DEPT VISIT HI MDM: CPT

## 2024-12-08 PROCEDURE — 86901 BLOOD TYPING SEROLOGIC RH(D): CPT

## 2024-12-08 PROCEDURE — 85730 THROMBOPLASTIN TIME PARTIAL: CPT

## 2024-12-08 PROCEDURE — 74177 CT ABD & PELVIS W/CONTRAST: CPT | Mod: 26,MC

## 2024-12-08 PROCEDURE — 80053 COMPREHEN METABOLIC PANEL: CPT

## 2024-12-08 PROCEDURE — 93005 ELECTROCARDIOGRAM TRACING: CPT

## 2024-12-08 PROCEDURE — 86850 RBC ANTIBODY SCREEN: CPT

## 2024-12-08 PROCEDURE — 82272 OCCULT BLD FECES 1-3 TESTS: CPT

## 2024-12-08 PROCEDURE — 99285 EMERGENCY DEPT VISIT HI MDM: CPT | Mod: 25

## 2024-12-08 PROCEDURE — 85610 PROTHROMBIN TIME: CPT

## 2024-12-08 PROCEDURE — 74177 CT ABD & PELVIS W/CONTRAST: CPT | Mod: MC

## 2024-12-08 PROCEDURE — 36415 COLL VENOUS BLD VENIPUNCTURE: CPT

## 2024-12-08 PROCEDURE — 93010 ELECTROCARDIOGRAM REPORT: CPT

## 2024-12-08 PROCEDURE — 83605 ASSAY OF LACTIC ACID: CPT

## 2024-12-08 PROCEDURE — 84702 CHORIONIC GONADOTROPIN TEST: CPT

## 2024-12-08 PROCEDURE — 85025 COMPLETE CBC W/AUTO DIFF WBC: CPT

## 2024-12-08 PROCEDURE — 86900 BLOOD TYPING SEROLOGIC ABO: CPT

## 2024-12-09 PROBLEM — Z78.9 OTHER SPECIFIED HEALTH STATUS: Chronic | Status: INACTIVE | Noted: 2020-01-28 | Resolved: 2024-12-08

## 2024-12-09 LAB — HPV HIGH+LOW RISK DNA PNL CVX: NOT DETECTED

## 2024-12-12 LAB — CYTOLOGY CVX/VAG DOC THIN PREP: ABNORMAL

## 2025-01-15 ENCOUNTER — APPOINTMENT (OUTPATIENT)
Age: 55
End: 2025-01-15
Payer: COMMERCIAL

## 2025-01-15 ENCOUNTER — ASOB RESULT (OUTPATIENT)
Age: 55
End: 2025-01-15

## 2025-01-15 ENCOUNTER — APPOINTMENT (OUTPATIENT)
Dept: ANTEPARTUM | Facility: CLINIC | Age: 55
End: 2025-01-15
Payer: COMMERCIAL

## 2025-01-15 VITALS
BODY MASS INDEX: 25.78 KG/M2 | HEIGHT: 64 IN | WEIGHT: 151 LBS | DIASTOLIC BLOOD PRESSURE: 80 MMHG | SYSTOLIC BLOOD PRESSURE: 131 MMHG

## 2025-01-15 DIAGNOSIS — N93.9 ABNORMAL UTERINE AND VAGINAL BLEEDING, UNSPECIFIED: ICD-10-CM

## 2025-01-15 DIAGNOSIS — D25.0 SUBMUCOUS LEIOMYOMA OF UTERUS: ICD-10-CM

## 2025-01-15 PROCEDURE — 76857 US EXAM PELVIC LIMITED: CPT | Mod: 59

## 2025-01-15 PROCEDURE — 76830 TRANSVAGINAL US NON-OB: CPT

## 2025-01-15 PROCEDURE — 99214 OFFICE O/P EST MOD 30 MIN: CPT

## 2025-02-11 ENCOUNTER — APPOINTMENT (OUTPATIENT)
Dept: OBGYN | Facility: CLINIC | Age: 55
End: 2025-02-11

## 2025-03-10 PROBLEM — E03.9 HYPOTHYROIDISM, UNSPECIFIED: Chronic | Status: ACTIVE | Noted: 2024-12-08

## 2025-03-10 PROBLEM — K21.9 GASTRO-ESOPHAGEAL REFLUX DISEASE WITHOUT ESOPHAGITIS: Chronic | Status: ACTIVE | Noted: 2024-12-08

## 2025-04-24 ENCOUNTER — APPOINTMENT (OUTPATIENT)
Dept: OBGYN | Facility: CLINIC | Age: 55
End: 2025-04-24

## 2025-04-24 VITALS
DIASTOLIC BLOOD PRESSURE: 73 MMHG | WEIGHT: 154 LBS | SYSTOLIC BLOOD PRESSURE: 131 MMHG | BODY MASS INDEX: 26.29 KG/M2 | HEIGHT: 64 IN

## 2025-04-24 PROCEDURE — 99214 OFFICE O/P EST MOD 30 MIN: CPT

## 2025-06-18 PROBLEM — N95.1 MENOPAUSAL SYMPTOMS: Status: ACTIVE | Noted: 2025-06-18

## 2025-07-29 ENCOUNTER — APPOINTMENT (OUTPATIENT)
Dept: OBGYN | Facility: CLINIC | Age: 55
End: 2025-07-29